# Patient Record
Sex: FEMALE | Race: WHITE | NOT HISPANIC OR LATINO | URBAN - METROPOLITAN AREA
[De-identification: names, ages, dates, MRNs, and addresses within clinical notes are randomized per-mention and may not be internally consistent; named-entity substitution may affect disease eponyms.]

---

## 2017-05-30 ENCOUNTER — OFFICE VISIT - HEALTHEAST (OUTPATIENT)
Dept: FAMILY MEDICINE | Facility: CLINIC | Age: 37
End: 2017-05-30

## 2017-05-30 DIAGNOSIS — J02.9 SORE THROAT: ICD-10-CM

## 2017-12-11 ENCOUNTER — OFFICE VISIT - HEALTHEAST (OUTPATIENT)
Dept: FAMILY MEDICINE | Facility: CLINIC | Age: 37
End: 2017-12-11

## 2017-12-11 DIAGNOSIS — J06.9 VIRAL URI: ICD-10-CM

## 2017-12-11 ASSESSMENT — MIFFLIN-ST. JEOR: SCORE: 1332.25

## 2019-02-06 ENCOUNTER — OFFICE VISIT - HEALTHEAST (OUTPATIENT)
Dept: FAMILY MEDICINE | Facility: CLINIC | Age: 39
End: 2019-02-06

## 2019-02-06 DIAGNOSIS — L29.0 ANAL ITCHING: ICD-10-CM

## 2019-05-02 ENCOUNTER — OFFICE VISIT - HEALTHEAST (OUTPATIENT)
Dept: FAMILY MEDICINE | Facility: CLINIC | Age: 39
End: 2019-05-02

## 2019-05-02 DIAGNOSIS — Z13.1 SCREENING FOR DIABETES MELLITUS: ICD-10-CM

## 2019-05-02 DIAGNOSIS — Z00.00 ROUTINE GENERAL MEDICAL EXAMINATION AT A HEALTH CARE FACILITY: ICD-10-CM

## 2019-05-02 DIAGNOSIS — Z13.220 LIPID SCREENING: ICD-10-CM

## 2019-05-02 LAB
CHOLEST SERPL-MCNC: 204 MG/DL
FASTING STATUS PATIENT QL REPORTED: YES
FASTING STATUS PATIENT QL REPORTED: YES
GLUCOSE BLD-MCNC: 79 MG/DL (ref 70–99)
HDLC SERPL-MCNC: 62 MG/DL
LDLC SERPL CALC-MCNC: 119 MG/DL
TRIGL SERPL-MCNC: 115 MG/DL

## 2019-05-02 ASSESSMENT — MIFFLIN-ST. JEOR: SCORE: 1270.56

## 2019-05-03 LAB
HPV SOURCE: NORMAL
HUMAN PAPILLOMA VIRUS 16 DNA: NEGATIVE
HUMAN PAPILLOMA VIRUS 18 DNA: NEGATIVE
HUMAN PAPILLOMA VIRUS FINAL DIAGNOSIS: NORMAL
HUMAN PAPILLOMA VIRUS OTHER HR: NEGATIVE
SPECIMEN DESCRIPTION: NORMAL

## 2019-05-09 LAB
BKR LAB AP ABNORMAL BLEEDING: NO
BKR LAB AP BIRTH CONTROL/HORMONES: NORMAL
BKR LAB AP CERVICAL APPEARANCE: NORMAL
BKR LAB AP GYN ADEQUACY: NORMAL
BKR LAB AP GYN INTERPRETATION: NORMAL
BKR LAB AP GYN OTHER FINDINGS: NORMAL
BKR LAB AP HPV REFLEX: NORMAL
BKR LAB AP LMP: NORMAL
BKR LAB AP PATIENT STATUS: NORMAL
BKR LAB AP PREVIOUS ABNORMAL: NORMAL
BKR LAB AP PREVIOUS NORMAL: NORMAL
HIGH RISK?: NO
PATH REPORT.COMMENTS IMP SPEC: NORMAL
RESULT FLAG (HE HISTORICAL CONVERSION): NORMAL

## 2019-05-17 ENCOUNTER — COMMUNICATION - HEALTHEAST (OUTPATIENT)
Dept: FAMILY MEDICINE | Facility: CLINIC | Age: 39
End: 2019-05-17

## 2020-11-19 ENCOUNTER — VIRTUAL VISIT (OUTPATIENT)
Dept: FAMILY MEDICINE | Facility: OTHER | Age: 40
End: 2020-11-19

## 2020-11-19 NOTE — PROGRESS NOTES
"Date: 2020 09:58:46  Clinician: Nicolasa De Los Santos  Clinician NPI: [id number]  Patient: Jana Ozuna  Patient : 1980  Patient Address: [address] [place], [place] [address]  Patient Phone: [phone]  Visit Protocol: URI  Patient Summary:  Jana is a 40 year old ( : 1980 ) female who initiated a [facility] Visit for COVID-19 (Coronavirus) evaluation and screening. When asked the question \"Please sign me up to receive news, health information and promotions. \", Jnaa responded \"No\".    Jana states her symptoms started suddenly 7-9 days ago. After her symptoms started, they improved and then got worse again.   Her symptoms consist of myalgia and a headache. She is experiencing mild difficulty breathing with activities but can speak normally in full sentences.   Symptom details   Headache: She states the headache is mild (1-3 on a 10 point pain scale).    Jana denies having vomiting, rhinitis, facial pain or pressure, chills, malaise, sore throat, teeth pain, ageusia, diarrhea, ear pain, wheezing, fever, cough, nasal congestion, nausea, and anosmia. She also denies taking antibiotic medication in the past month, having recent facial or sinus surgery in the past 60 days, and having a sinus infection within the past year.   Precipitating events  She has not recently been exposed to someone with influenza. Jana has not been in close contact with any high risk individuals.   Pertinent COVID-19 (Coronavirus) information  Jana does not work or volunteer as healthcare worker or a . In the past 14 days, Jana has not worked or volunteered at a healthcare facility or group living setting.   In the past 14 days, she also has not lived in a congregate living setting.   Jana has not had a close contact with a laboratory-confirmed COVID-19 patient within 14 days of symptom onset.    Since 2019, Jana has not been tested for COVID-19 and has not had upper " respiratory infection or influenza-like illness.   Pertinent medical history  Jana does not get yeast infections when she takes antibiotics.   Jana does not need a return to work/school note.   Weight: 160 lbs   Jana does not smoke or use smokeless tobacco.   She denies pregnancy and denies breastfeeding. She is currently menstruating.   Additional information as reported by the patient (free text): 11/12/2020 I woke up in the middle of the night short of breath and took hours to settle enough to get back to sleep. This has happened every night since. It's worse at night when I lie down. Sometimes it lasts or hits during the day as well. Not sure if it's stress, anxiety, panic, covid or something else entirely.   Weight: 160 lbs    MEDICATIONS: No current medications, ALLERGIES: NKDA  Clinician Response:  Dear Jana,   Your symptoms show that you may have coronavirus (COVID-19). This illness can cause fever, cough and trouble breathing. Many people get a mild case and get better on their own. Some people can get very sick.  Based on the symptoms you have shared, I would like you to be re-checked in 2 to 3 days. Please call your family clinic to set up a video or phone visit.  Will I be tested for COVID-19?  We would like to test you for this virus.   Please call [phone] to schedule your visit. Explain that you were referred by [facility] to have a COVID-19 test. Be ready to share your [facility] visit ID number.   * If you need to schedule in [place] or [facility] please call [phone] or for [employer] employees please call [phone].    The following will serve as your written order for this COVID Test, ordered by me, for the indication of suspected COVID [Z20.828]: The test will be ordered in [employer], our electronic health record, after you are scheduled. It will show as ordered and authorized by Seymour Agosto MD.  Order: COVID-19 (Coronavirus) PCR for SYMPTOMATIC testing from [facility].   1.When it's  "time for your COVID test:   Stay at least 6 feet away from others. (If someone will drive you to your test, stay in the backseat, as far away from the  as you can.)   Cover your mouth and nose with a mask, tissue or washcloth.  Go straight to the testing site. Don't make any stops on the way there or back.      2.Starting now: Stay home and away from others (self-isolate) until:   You've had no fever---and no medicine that reduces fever---for one full day (24 hours). And...   Your other symptoms have gotten better. For example, your cough or breathing has improved. And...   At least 10 days have passed since your symptoms started.       During this time, don't leave the house except for testing or medical care.   Stay in your own room, even for meals. Use your own bathroom if you can.   Stay away from others in your home. No hugging, kissing or shaking hands. No visitors.  Don't go to work, school or anywhere else.    Clean \"high touch\" surfaces often (doorknobs, counters, handles, etc.). Use a household cleaning spray or wipes. You'll find a full list of  on the EPA website: www.epa.gov/pesticide-registration/list-n-disinfectants-use-against-sars-cov-2.   Cover your mouth and nose with a mask, tissue or washcloth to avoid spreading germs.  Wash your hands and face often. Use soap and water.  Caregivers in these groups are at risk for severe illness due to COVID-19:  o People 65 years and older  o People who live in a nursing home or long-term care facility  o People with chronic disease (lung, heart, cancer, diabetes, kidney, liver, immunologic)   o People who have a weakened immune system, including those who:   Are in cancer treatment  Take medicine that weakens the immune system, such as corticosteroids  Had a bone marrow or organ transplant  Have an immune deficiency  Have poorly controlled HIV or AIDS  Are obese (body mass index of 40 or higher)  Smoke regularly   o Caregivers should wear gloves " while washing dishes, handling laundry and cleaning bedrooms and bathrooms.  o Use caution when washing and drying laundry: Don't shake dirty laundry, and use the warmest water setting that you can.  o For more tips, go to www.cdc.gov/coronavirus/2019-ncov/downloads/10Things.pdf.      How can I take care of myself?   Get lots of rest. Drink extra fluids (unless a doctor has told you not to)   Take Tylenol (acetaminophen) for fever or pain. If you have liver or kidney problems, ask your family doctor if it's okay to take Tylenol.   Adults can take either:    650 mg (two 325 mg pills) every 4 to 6 hours, or...   1,000 mg (two 500 mg pills) every 8 hours as needed.    Note: Don't take more than 3,000 mg in one day. Acetaminophen is found in many medicines (both prescribed and over-the-counter medicines). Read all labels to be sure you don't take too much.   For children, check the Tylenol bottle for the right dose. The dose is based on the child's age or weight.    If you have other health problems (like cancer, heart failure, an organ transplant or severe kidney disease): Call your specialty clinic if you don't feel better in the next 2 days.       Know when to call 911. Emergency warning signs include:    Trouble breathing or shortness of breath Pain or pressure in the chest that doesn't go away Feeling confused like you haven't felt before, or not being able to wake up Bluish-colored lips or face  Where can I get more information?   [facility] -- About COVID-19: [email or web address]   CDC -- What to Do If You're Sick: www.cdc.gov/coronavirus/2019-ncov/about/steps-when-sick.html   CDC -- Ending Home Isolation: www.cdc.gov/coronavirus/2019-ncov/hcp/disposition-in-home-patients.html   CDC -- Caring for Someone: www.cdc.gov/coronavirus/2019-ncov/if-you-are-sick/care-for-someone.html   [facility] -- Interim Guidance for Hospital Discharge to Home:  www.health.[facility].[place].us/diseases/coronavirus/hcp/hospdischarge.pdf   [facility] clinical trials (COVID-19 research studies): clinicalaffairs.[facility]/umn-clinical-trials    Below are the COVID-19 hotlines at the [facility] ([facility]). Interpreters are available.    For health questions: Call [phone] or 1-290.300.2555 (7 a.m. to 7 p.m.) For questions about schools and childcare: Call 857-290-8827 or 1-306.344.3699 (7 a.m. to 7 p.m.)       Diagnosis: Contact with and (suspected) exposure to other viral communicable diseases  Diagnosis ICD: Z20.828

## 2020-11-20 ENCOUNTER — AMBULATORY - HEALTHEAST (OUTPATIENT)
Dept: FAMILY MEDICINE | Facility: CLINIC | Age: 40
End: 2020-11-20

## 2020-11-20 DIAGNOSIS — Z20.822 SUSPECTED COVID-19 VIRUS INFECTION: ICD-10-CM

## 2020-11-21 ENCOUNTER — AMBULATORY - HEALTHEAST (OUTPATIENT)
Dept: FAMILY MEDICINE | Facility: CLINIC | Age: 40
End: 2020-11-21

## 2020-11-21 DIAGNOSIS — Z20.822 SUSPECTED COVID-19 VIRUS INFECTION: ICD-10-CM

## 2021-05-28 NOTE — PROGRESS NOTES
FEMALE ADULT PREVENTIVE EXAM    CHIEF COMPLAINT:  Female preventive exam.    SUBJECTIVE:  Jana Ozuna is a 38 y.o. female who presents for her routine physical exam.    Patient would like to address the following concerns today: none      GYNE HISTORY  Menses: regular  Sexually Active: yes with .  Contraception:  had vasectomy.  Last Pap:  - normal.   Abnormal Pap: 2005 - positive HPV - not worrisome.    Vaginal Discharge: no      She  has no past medical history on file.    Lab Results   Component Value Date    WBC 6.2 2012    HGB 9.4 (L) 10/23/2014    HCT 37.1 2012    MCV 92 2012     2012     No results found for: CHOL, HDL, LDLCALC, TRIG  No results found for: TSH  BP Readings from Last 3 Encounters:   19 110/70   19 112/68   17 108/60       Surgeries:    Past Surgical History:   Procedure Laterality Date      SECTION        SECTION N/A 10/22/2014    Procedure: REPEAT  SECTION ;  Surgeon: Ny Bauman MD;  Location: [facility];  Service:      LA  DELIVERY ONLY      Description:  Section Low Transverse;  Recorded: 2013;  Comments: due to breech position of Twins       Family History:  Her family history includes Alcohol abuse in her father; Breast cancer (age of onset: 70) in her maternal grandmother; COPD in her mother; Heart disease in her father; Mental illness in her mother.    Social History:  She  reports that she has never smoked. She has never used smokeless tobacco. She reports that she does not drink alcohol or use drugs.    Medications:  No current outpatient medications on file.      Allergies:  No latex allergies.  Allergies   Allergen Reactions     Amoxicillin Nausea And Vomiting            RISK BEHAVIOR & HEALTH HABITS  Self Breast Exam: yes.  Regular Exercise: working on it, cardio videos and strength.  Balanced diet: working on healthier balanced meal.  Seat Belt  Use: yes  Calcium intake/Osteoporosis prevention: Vitamin D supplement..    Guns: NO  Sun Screen: YES  Dental Care: YES    REVIEW OF SYSTEMS:  Complete head to toe review of systems is otherwise negative except as above.    OBJECTIVE:  VITAL SIGNS:    Vitals:    05/02/19 0910   BP: 110/70   Pulse: 81   Resp: 16   Temp: 97.8  F (36.6  C)   SpO2: 100%     GENERAL:  Patient alert, in no acute distress.  EYES: PERRLA. Extraoccular movements intact, pupils equal, reactive to light and accommodation.  Normal conjunctiva and lids.  Undilated fudoscopic exam normal, including normal size, appearance cup-to-disc ratio.  Normal posterior segments, including no exudates or hemorrhages.  ENT:  Hearing grossly normal.  Normal appearance to ears and nose.  Bilateral TM s, external canals, oropharynx normal. Normal lips, gums and teeth.  Normal nasal mucosa, septum and turbinates.  NECK:  Supple, without thyromegaly or mass.  RESP:  Clear to auscultation without crackles, wheezes or distress.  Normal respiratory effort.   CV:  Regular rate and rhythm without murmurs, rubs or gallops.  Normal carotid, abdominal aorta, femoral and pedal pulses.  No varicosities or edema.  ABDOMEN:  Soft, non-tender, without hepatosplenomegaly, masses, or hernias.   BREASTS:  Nontender, without masses, nipple discharge, erythema, or axillary adenopathy.    :    External genitalia:  Normal without lesions.  Urethra: Normal appearing  Vagina: Normal without discharge  Cervix: normal.  Uterus:  Nontender, mobile, without masses  Ovaries: Normal without masses  LYMPHATIC: Normal palpation of neck, groin and axilla..  No lymphadenopathy.  No bruising.  NEURO:  CN II-XII intact, motor & sensory function all intact.  DTR and reflexes normal.  PSYCHIATRIC:  Alert & oriented with normal mood and affect.  Good judgment and insight.  SKIN:  Normal inspection and palpation.  MUSCULOSKELETAL: Normal gait and station.  - Spine / Ribs / Pelvis: Normal inspection,  ROM, stability and strength: Spine, Head, Neck, Upper and Lower Extremities.    ASSESSMENT & PLAN  Jana was seen today for annual exam.    Diagnoses and all orders for this visit:    Routine general medical examination at a health care facility  -     Gynecologic Cytology (PAP Smear)  Patient is working from home and working on increasing her strategies around exercise and diet.  Family history reviewed for high risk screening.  Vaccines are up to date.    Lipid screening  -     Lipid [place]aine    Screening for diabetes mellitus  -     Glucose

## 2021-05-30 ENCOUNTER — RECORDS - HEALTHEAST (OUTPATIENT)
Dept: ADMINISTRATIVE | Facility: CLINIC | Age: 41
End: 2021-05-30

## 2021-05-31 VITALS — BODY MASS INDEX: 29.76 KG/M2 | WEIGHT: 157.6 LBS | HEIGHT: 61 IN

## 2021-05-31 VITALS — WEIGHT: 153.4 LBS

## 2021-06-02 VITALS — WEIGHT: 148 LBS | BODY MASS INDEX: 27.96 KG/M2

## 2021-06-03 VITALS — BODY MASS INDEX: 27.19 KG/M2 | WEIGHT: 144 LBS | HEIGHT: 61 IN

## 2021-06-14 NOTE — PROGRESS NOTES
Assessment:     1. Viral URI         Return if symptoms worsen or fail to improve.    Plan:     Viral URI  Symptomatic care discussed.  Using a cool mist humidifier at night to help prevent vasomotor rhinitis which she has a history of was discussed.  If not improving in the next 3-4 days, call and then will move to empiric treatment for sinusitis.      Subjective:       37 y.o. female presents for evaluation right ear pain with mild pressure across forehead ×1 day and now some mild pinkness in her right eye.  Denies any hearing changes, vision changes, weakness, difficulty swallowing, fevers or chills.  It started last night while she was laying on the couch she started feeling some pressure around her right ear and just above it.  He seemed to be a little worse when she sat up she feels some pressure across her forehead.  No nausea or vomiting.  She has been having a little bit of stress lately as both she and her  have changed jobs, her  is recently in a car accident which he is okay but did total her car, they do have change of insurances coming up and the girls are all attending school.  All of this has led to a little bit of anxiety recently.  She did take some ibuprofen for she went to bed and approximately 4 AM she woke up and she can still feel the pain over her right ear and across her forehead.  She did take some Tylenol this morning and it again improved.  During the day she ended notices little bit of pinkness and irritation to the right eye.  No change in vision.  Symptoms have actually improved since yesterday.    The following portions of the patient's history were reviewed and updated as appropriate: allergies, current medications, past family history, past medical history, past social history, past surgical history and problem list.    Review of Systems  A 12 point comprehensive review of systems was negative except as noted.     History   Smoking Status     Never Smoker   Smokeless  "Tobacco     Not on file       Objective:      /60 (Patient Site: Left Arm, Patient Position: Sitting, Cuff Size: Adult Large)  Pulse 64  Temp 97.9  F (36.6  C) (Oral)   Resp 12  Ht 5' 1\" (1.549 m)  Wt 157 lb 9.6 oz (71.5 kg)  LMP 11/20/2017 (Exact Date)  Breastfeeding? No  BMI 29.78 kg/m2  General appearance: alert, appears stated age and cooperative  Head: Normocephalic, without obvious abnormality, atraumatic, sinuses nontender to percussion  Eyes: Right conjunctiva with mild injection, left is clear.  EOMI, PERRLA  Ears: Mild erythema in the right canal but otherwise normal tympanic membranes bilateral.  No tenderness palpation of the tragus or pinna  Nose: turbinates Mild edema, dry appearing mucosa  Throat: lips, mucosa, and tongue normal; teeth and gums normal  Neck: no adenopathy, supple, symmetrical, trachea midline and thyroid not enlarged, symmetric, no tenderness/mass/nodules  Lungs: clear to auscultation bilaterally  Heart: regular rate and rhythm, S1, S2 normal, no murmur, click, rub or gallop  Pulses: 2+ and symmetric       This note has been dictated using voice recognition software. Any grammatical or context distortions are unintentional and inherent to the software  "

## 2021-06-19 NOTE — LETTER
Letter by Vivian Rodriguez MD at      Author: Vivian Rodriguez MD Service: -- Author Type: --    Filed:  Encounter Date: 5/17/2019 Status: (Other)         Jana Ozuna  [address] [facility] [place] [address]             May 17, 2019         Dear Ms. Ozuna,    Below are the results from your recent visit:      Good news, your pap smear is normal and your HPV is negative.  That means your next pap is in 5 years.  We continue to recommend annual physical exams if you have any chronic medical conditions or are prescribed any medications.      Please call with questions or contact us using Expert.    Sincerely,        Electronically signed by Vivian Rodriguez MD

## 2021-06-20 ENCOUNTER — HEALTH MAINTENANCE LETTER (OUTPATIENT)
Age: 41
End: 2021-06-20

## 2021-06-23 NOTE — PROGRESS NOTES
Patient is a 39 yo female who presents with rectal discomfort.    She notes that this has been intermittent for the past year.  She notes that she feels a slight dull ache at the rectum, but then it becomes itchy.  She notes that if she itches, the skin will come off.  She has tried cortisone 10 - which helps with the itching.  She notes that it is bad about once a week.  She wears cotton underwear.  She notes no pain with BMs.  They are normal, brown, soft.  She notes that wiping can exacerbate it.  She has tried hemorrhoid creams and TUCKs pads, but they have been ineffective.  She notes her symptoms are worse towards the evening, to the point of keeping her awake at night, but not bad in early morning.    Objective     /68 (Patient Site: Left Arm, Patient Position: Sitting, Cuff Size: Adult Regular)   Pulse 96   Wt 148 lb (67.1 kg)   LMP 01/14/2019   SpO2 99%   Breastfeeding? No   BMI 27.96 kg/m    General appearance: alert, appears stated age and cooperative  Abdomen: soft, non-tender; bowel sounds normal; no masses,  no organomegaly  Pelvic: Normal external genitalia. Anterior, nonthrombosed hemorrhoid at 2 oclock with mild perianal erythema.      Jana was seen today for concerns.    Diagnoses and all orders for this visit:    Anal itching  Discussed that it looked like generalized irritation.  Patient notes difficulty with cleaning after BM.  Recommend gentle cleaning with toilet paper, but then doing cleaning with gentle baby wipe to decrease skin irritation.  Discussed eval for pinworm.  Vs. Empiric treatment with pyrantel.  Patient would like to try empiric treatment.  If symptoms worsen or do not improve, patient to notify the clinic.
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
## 2021-10-11 ENCOUNTER — HEALTH MAINTENANCE LETTER (OUTPATIENT)
Age: 41
End: 2021-10-11

## 2022-07-17 ENCOUNTER — HEALTH MAINTENANCE LETTER (OUTPATIENT)
Age: 42
End: 2022-07-17

## 2022-09-24 ENCOUNTER — HEALTH MAINTENANCE LETTER (OUTPATIENT)
Age: 42
End: 2022-09-24

## 2023-08-05 ENCOUNTER — HEALTH MAINTENANCE LETTER (OUTPATIENT)
Age: 43
End: 2023-08-05

## 2024-03-02 ENCOUNTER — HEALTH MAINTENANCE LETTER (OUTPATIENT)
Age: 44
End: 2024-03-02